# Patient Record
Sex: MALE | Race: WHITE | URBAN - METROPOLITAN AREA
[De-identification: names, ages, dates, MRNs, and addresses within clinical notes are randomized per-mention and may not be internally consistent; named-entity substitution may affect disease eponyms.]

---

## 2018-01-25 ENCOUNTER — OFFICE VISIT (OUTPATIENT)
Dept: FAMILY MEDICINE | Facility: CLINIC | Age: 28
End: 2018-01-25
Payer: COMMERCIAL

## 2018-01-25 VITALS
BODY MASS INDEX: 24.57 KG/M2 | TEMPERATURE: 98 F | SYSTOLIC BLOOD PRESSURE: 123 MMHG | WEIGHT: 165.9 LBS | DIASTOLIC BLOOD PRESSURE: 73 MMHG | HEART RATE: 79 BPM | RESPIRATION RATE: 16 BRPM | OXYGEN SATURATION: 98 % | HEIGHT: 69 IN

## 2018-01-25 DIAGNOSIS — F41.9 ANXIETY: ICD-10-CM

## 2018-01-25 DIAGNOSIS — Z00.00 ENCOUNTER FOR PREVENTIVE HEALTH EXAMINATION: Primary | ICD-10-CM

## 2018-01-25 DIAGNOSIS — G47.00 INSOMNIA, UNSPECIFIED TYPE: ICD-10-CM

## 2018-01-25 DIAGNOSIS — H52.13 MYOPIA OF BOTH EYES: ICD-10-CM

## 2018-01-25 DIAGNOSIS — F90.9 ATTENTION DEFICIT HYPERACTIVITY DISORDER (ADHD), UNSPECIFIED ADHD TYPE: ICD-10-CM

## 2018-01-25 DIAGNOSIS — Z11.3 ROUTINE SCREENING FOR STI (SEXUALLY TRANSMITTED INFECTION): ICD-10-CM

## 2018-01-25 DIAGNOSIS — F19.20 CHEMICAL DEPENDENCY (H): ICD-10-CM

## 2018-01-25 DIAGNOSIS — Z72.0 TOBACCO ABUSE: ICD-10-CM

## 2018-01-25 DIAGNOSIS — G43.109 MIGRAINE WITH AURA AND WITHOUT STATUS MIGRAINOSUS, NOT INTRACTABLE: ICD-10-CM

## 2018-01-25 DIAGNOSIS — Z11.59 NEED FOR HEPATITIS B SCREENING TEST: ICD-10-CM

## 2018-01-25 DIAGNOSIS — Z23 ENCOUNTER FOR ADMINISTRATION OF VACCINE: ICD-10-CM

## 2018-01-25 LAB
CHOLEST SERPL-MCNC: 179 MG/DL
GLUCOSE P FAST SERPL-MCNC: 90 MG/DL (ref 51–110)
HDLC SERPL-MCNC: 52 MG/DL
LDLC SERPL CALC-MCNC: 113 MG/DL
NONHDLC SERPL-MCNC: 127 MG/DL
TRIGL SERPL-MCNC: 71 MG/DL

## 2018-01-25 RX ORDER — TRAZODONE HYDROCHLORIDE 100 MG/1
100 TABLET ORAL DAILY
COMMUNITY
Start: 2018-01-19 | End: 2018-01-25 | Stop reason: ALTCHOICE

## 2018-01-25 RX ORDER — IBUPROFEN 600 MG/1
600 TABLET, FILM COATED ORAL PRN
COMMUNITY
End: 2018-02-28

## 2018-01-25 RX ORDER — BUSPIRONE HYDROCHLORIDE 10 MG/1
40 TABLET ORAL DAILY
COMMUNITY
Start: 2018-01-19 | End: 2018-02-28 | Stop reason: DRUGHIGH

## 2018-01-25 RX ORDER — TRAZODONE HYDROCHLORIDE 150 MG/1
150 TABLET ORAL AT BEDTIME
Qty: 30 TABLET | Refills: 1 | Status: SHIPPED | OUTPATIENT
Start: 2018-01-25 | End: 2018-02-28

## 2018-01-25 RX ORDER — NAPROXEN 500 MG/1
500 TABLET ORAL 2 TIMES DAILY PRN
Qty: 30 TABLET | Refills: 1 | Status: SHIPPED | OUTPATIENT
Start: 2018-01-25 | End: 2018-02-28

## 2018-01-25 RX ORDER — CITALOPRAM HYDROBROMIDE 20 MG/1
20 TABLET ORAL DAILY
COMMUNITY
Start: 2018-01-19 | End: 2018-02-28

## 2018-01-25 ASSESSMENT — ANXIETY QUESTIONNAIRES
3. WORRYING TOO MUCH ABOUT DIFFERENT THINGS: NOT AT ALL
1. FEELING NERVOUS, ANXIOUS, OR ON EDGE: NOT AT ALL
6. BECOMING EASILY ANNOYED OR IRRITABLE: MORE THAN HALF THE DAYS
IF YOU CHECKED OFF ANY PROBLEMS ON THIS QUESTIONNAIRE, HOW DIFFICULT HAVE THESE PROBLEMS MADE IT FOR YOU TO DO YOUR WORK, TAKE CARE OF THINGS AT HOME, OR GET ALONG WITH OTHER PEOPLE: SOMEWHAT DIFFICULT
2. NOT BEING ABLE TO STOP OR CONTROL WORRYING: NOT AT ALL
7. FEELING AFRAID AS IF SOMETHING AWFUL MIGHT HAPPEN: NOT AT ALL
GAD7 TOTAL SCORE: 6
5. BEING SO RESTLESS THAT IT IS HARD TO SIT STILL: MORE THAN HALF THE DAYS

## 2018-01-25 ASSESSMENT — PAIN SCALES - GENERAL: PAINLEVEL: NO PAIN (0)

## 2018-01-25 ASSESSMENT — PATIENT HEALTH QUESTIONNAIRE - PHQ9: 5. POOR APPETITE OR OVEREATING: MORE THAN HALF THE DAYS

## 2018-01-25 NOTE — MR AVS SNAPSHOT
After Visit Summary   1/25/2018    Darian Lim    MRN: [id number]           Patient Information     Date Of Birth          1990        Visit Information        Provider Department      1/25/2018 9:00 AM Lynne Davis NP [facility]        Today's Diagnoses     Encounter for preventive health examination    -  1    Migraine with aura and without status migrainosus, not intractable        Chemical dependency (H)        Insomnia, unspecified type        Tobacco abuse        Routine screening for STI (sexually transmitted infection)        Myopia of both eyes        Encounter for administration of vaccine        Need for hepatitis B screening test          Care Instructions    1) New medications ordered to pharmacy.  2) Referral to [facility], the psychology specialists to adjust medications.  3) Referral to ophthalmology  4) Lab work pending, will call for follow-up appt if abnormal results.          Follow-ups after your visit        Additional Services     OPHTHALMOLOGY ADULT REFERRAL       Your provider has referred you to: [facility]: Eye Clinic [facility] [phone]   [email or web address]    Please be aware that coverage of these services is subject to the terms and limitations of your health insurance plan.  Call member services at your health plan with any benefit or coverage questions.      Please bring the following with you to your appointment:    (1) Any X-Rays, CTs or MRIs which have been performed.  Contact the facility where they were done to arrange for  prior to your scheduled appointment.    (2) List of current medications  (3) This referral request   (4) Any documents/labs given to you for this referral                  Who to contact     Please call your clinic at [phone] to:    Ask questions about your health    Make or cancel appointments    Discuss your medicines    Learn about your test results    Speak to your doctor   If you have  "compliments or concerns about an experience at your clinic, or if you wish to file a complaint, please contact [facility] Patient Relations at [phone] or email us at [email or web address]         Additional Information About Your Visit        [email or web address] Information     "[employer] is an electronic gateway that provides easy, online access to your medical records. With "[employer] you can request a clinic appointment, read your test results, renew a prescription or communicate with your care team.     To sign up for "[employer] visit the website at [email or web address]   You will be asked to enter the access code listed below, as well as some personal information. Please follow the directions to create your username and password.     Your access code is: [email or web address]  Expires: 2018 10:00 AM     Your access code will  in 90 days. If you need help or a new code, please contact your [facility] or call [phone] for assistance.        Care EveryWhere ID     This is your Care EveryWhere ID. This could be used by other organizations to access your [facility] medical records  [id number]        Your Vitals Were     Pulse Temperature Respirations Height Pulse Oximetry BMI (Body Mass Index)    79 98  F (36.7  C) (Oral) 16 5' 9.3\" (176 cm) 98% 24.29 kg/m2       Blood Pressure from Last 3 Encounters:   18 123/73    Weight from Last 3 Encounters:   18 165 lb 14.4 oz (75.3 kg)              We Performed the Following     CHLAMYDIA TRACHOMATIS PCR     Glucose Fasting ([facility])     Hepatitis B Surface Antibody     Hepatitis C Screen Reflex to HCV RNA Quant and Genotype     HIV Antigen Antibody Combo     Lipid panel reflex to direct LDL Fasting     NEISSERIA GONORRHOEA PCR     OPHTHALMOLOGY ADULT REFERRAL     SMOKING CESSATION COUNSELING 3-10 MIN (Tobacco Nicotine) [94413]     TDAP VACCINE (BOOSTRIX)     VACCINE ADMINISTRATION, INITIAL "          Today's Medication Changes          These changes are accurate as of 1/25/18 10:00 AM.  If you have any questions, ask your nurse or doctor.               Start taking these medicines.        Dose/Directions    naproxen 500 MG tablet   Commonly known as:  NAPROSYN   Started by:  Lynne Davis NP        Dose:  500 mg   Take 1 tablet (500 mg) by mouth 2 times daily as needed for moderate pain   Quantity:  30 tablet   Refills:  1       nicotine 10 MG Inhaler   Commonly known as:  NICOTROL   Used for:  Tobacco abuse   Started by:  Lynne Davis NP        Dose:  6-16 Cartridge   Inhale 6-16 Cartridges into the lungs daily as needed for smoking cessation   Quantity:  180 each   Refills:  0         These medicines have changed or have updated prescriptions.        Dose/Directions    traZODone 150 MG tablet   Commonly known as:  DESYREL   This may have changed:    - medication strength  - how much to take  - when to take this   Used for:  Insomnia, unspecified type   Changed by:  Lynne Davis NP        Dose:  150 mg   Take 1 tablet (150 mg) by mouth At Bedtime   Quantity:  30 tablet   Refills:  1            Where to get your medicines      These medications were sent to [facility]/pharmacy #[id number] - [place] [address]     Phone:  [phone]     naproxen 500 MG tablet    nicotine 10 MG Inhaler    traZODone 150 MG tablet                Primary Care Provider Office Phone # Fax #    ZBIGNIEW Pastor [phone]       [address] [address]        Equal Access to Services     JONATHON AGUAYO AH: Hadii tania ku hadasho Soomaali, waaxda luqadaha, qaybta kaalmada adeegyada, fady diana. So [facility] [phone].    ATENCIÓN: Si habla español, tiene a kelley disposición servicios gratuitos de asistencia lingüística. Llame al [phone].    We comply with applicable  federal civil rights laws and [place] laws. We do not discriminate on the basis of race, color, national origin, age, disability, sex, sexual orientation, or gender identity.            Thank you!     Thank you for choosing [facility]  for your care. Our goal is always to provide you with excellent care. Hearing back from our patients is one way we can continue to improve our services. Please take a few minutes to complete the written survey that you may receive in the mail after your visit with us. Thank you!             Your Updated Medication List - Protect others around you: Learn how to safely use, store and throw away your medicines at www.disposemymeds.org.          This list is accurate as of 1/25/18 10:00 AM.  Always use your most recent med list.                   Brand Name Dispense Instructions for use Diagnosis    busPIRone 10 MG tablet    BUSPAR     Take 20 mg by mouth daily        citalopram 20 MG tablet    celeXA     Take 20 mg by mouth daily        ibuprofen 600 MG tablet    ADVIL/MOTRIN     Take 600 mg by mouth as needed for moderate pain        naproxen 500 MG tablet    NAPROSYN    30 tablet    Take 1 tablet (500 mg) by mouth 2 times daily as needed for moderate pain        nicotine 10 MG Inhaler    NICOTROL    180 each    Inhale 6-16 Cartridges into the lungs daily as needed for smoking cessation    Tobacco abuse       traZODone 150 MG tablet    DESYREL    30 tablet    Take 1 tablet (150 mg) by mouth At Bedtime    Insomnia, unspecified type

## 2018-01-25 NOTE — NURSING NOTE
"27 year old  Chief Complaint   Patient presents with     Physical     Last physical: October of 2017. No questions or concerns. No flu shot.      Imm/Inj     Would like tetanus shot. Don't remember when last tetanus shot was. Don't know if he ever had a tetnaus.        Blood pressure 123/73, pulse 79, temperature 98  F (36.7  C), temperature source Oral, resp. rate 16, height 5' 9.3\" (176 cm), weight 165 lb 14.4 oz (75.3 kg), SpO2 98 %. Body mass index is 24.29 kg/(m^2).  BP completed using cuff size: regular    There is no problem list on file for this patient.      Wt Readings from Last 2 Encounters:   01/25/18 165 lb 14.4 oz (75.3 kg)     BP Readings from Last 3 Encounters:   01/25/18 123/73       Current Outpatient Prescriptions   Medication     busPIRone (BUSPAR) 10 MG tablet     citalopram (CELEXA) 20 MG tablet     traZODone (DESYREL) 100 MG tablet     ibuprofen (ADVIL/MOTRIN) 600 MG tablet     No current facility-administered medications for this visit.        Social History   Substance Use Topics     Smoking status: Current Every Day Smoker     Packs/day: 1.00     Types: Cigarettes     Smokeless tobacco: Never Used     Alcohol use Not on file       Health Maintenance Due   Topic Date Due     TETANUS IMMUNIZATION (SYSTEM ASSIGNED)  07/21/2010     INFLUENZA VACCINE (SYSTEM ASSIGNED)  09/01/2017       No results found for: PAP    PHQ-2 ( 1999 Pfizer) 1/25/2018   Q1: Little interest or pleasure in doing things 0   Q2: Feeling down, depressed or hopeless 0   PHQ-2 Score 0       PHQ-9 SCORE 1/25/2018   Total Score 9       IBETH-7 SCORE 1/25/2018   Total Score 6       No flowsheet data found.    Briana Bosch MA  January 25, 2018 9:00 AM  "

## 2018-01-25 NOTE — PATIENT INSTRUCTIONS
1) New medications ordered to pharmacy.  2) Referral to [facility], the psychology specialists to adjust medications.  3) Referral to ophthalmology  4) Lab work pending, will call for follow-up appt if abnormal results.

## 2018-01-26 LAB
C TRACH DNA SPEC QL NAA+PROBE: POSITIVE
HBV SURFACE AB SERPL IA-ACNC: 8.76 M[IU]/ML
HCV AB SERPL QL IA: NONREACTIVE
HIV 1+2 AB+HIV1 P24 AG SERPL QL IA: NONREACTIVE
N GONORRHOEA DNA SPEC QL NAA+PROBE: NEGATIVE
SPECIMEN SOURCE: ABNORMAL
SPECIMEN SOURCE: NORMAL

## 2018-01-26 ASSESSMENT — ANXIETY QUESTIONNAIRES: GAD7 TOTAL SCORE: 6

## 2018-01-26 ASSESSMENT — PATIENT HEALTH QUESTIONNAIRE - PHQ9: SUM OF ALL RESPONSES TO PHQ QUESTIONS 1-9: 9

## 2018-01-30 ENCOUNTER — TELEPHONE (OUTPATIENT)
Dept: FAMILY MEDICINE | Facility: CLINIC | Age: 28
End: 2018-01-30

## 2018-01-30 DIAGNOSIS — A74.9 CHLAMYDIA: Primary | ICD-10-CM

## 2018-01-30 RX ORDER — AZITHROMYCIN 500 MG/1
1000 TABLET, FILM COATED ORAL ONCE
Qty: 2 TABLET | Refills: 0 | Status: SHIPPED | OUTPATIENT
Start: 2018-01-30 | End: 2018-01-30

## 2018-01-31 ENCOUNTER — TELEPHONE (OUTPATIENT)
Dept: FAMILY MEDICINE | Facility: CLINIC | Age: 28
End: 2018-01-31

## 2018-01-31 NOTE — TELEPHONE ENCOUNTER
1/26/18:  Patient was called with positive chlamydia report and medication was prescribed and sent to pharmacy.    ([facility] EHR was down so patient contacted through treatment facility and spoken to personally.)

## 2018-02-28 ENCOUNTER — OFFICE VISIT (OUTPATIENT)
Dept: FAMILY MEDICINE | Facility: CLINIC | Age: 28
End: 2018-02-28
Payer: COMMERCIAL

## 2018-02-28 VITALS
WEIGHT: 167.4 LBS | OXYGEN SATURATION: 96 % | RESPIRATION RATE: 18 BRPM | DIASTOLIC BLOOD PRESSURE: 75 MMHG | SYSTOLIC BLOOD PRESSURE: 127 MMHG | BODY MASS INDEX: 24.51 KG/M2 | HEART RATE: 85 BPM | TEMPERATURE: 98.2 F

## 2018-02-28 DIAGNOSIS — Z72.0 TOBACCO ABUSE: ICD-10-CM

## 2018-02-28 DIAGNOSIS — G89.29 CHRONIC LOW BACK PAIN, UNSPECIFIED BACK PAIN LATERALITY, WITH SCIATICA PRESENCE UNSPECIFIED: ICD-10-CM

## 2018-02-28 DIAGNOSIS — G43.109 MIGRAINE WITH AURA AND WITHOUT STATUS MIGRAINOSUS, NOT INTRACTABLE: ICD-10-CM

## 2018-02-28 DIAGNOSIS — M54.5 CHRONIC LOW BACK PAIN, UNSPECIFIED BACK PAIN LATERALITY, WITH SCIATICA PRESENCE UNSPECIFIED: ICD-10-CM

## 2018-02-28 DIAGNOSIS — G47.00 INSOMNIA, UNSPECIFIED TYPE: Primary | ICD-10-CM

## 2018-02-28 RX ORDER — QUETIAPINE FUMARATE 50 MG/1
50 TABLET, FILM COATED ORAL
Qty: 30 TABLET | Refills: 0 | Status: SHIPPED | OUTPATIENT
Start: 2018-02-28

## 2018-02-28 RX ORDER — NAPROXEN 500 MG/1
500 TABLET ORAL 2 TIMES DAILY PRN
Qty: 30 TABLET | Refills: 1 | Status: SHIPPED | OUTPATIENT
Start: 2018-02-28

## 2018-02-28 ASSESSMENT — PAIN SCALES - GENERAL: PAINLEVEL: SEVERE PAIN (6)
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
## 2019-11-15 NOTE — TELEPHONE ENCOUNTER
[facility].     Lynne Davis, DNP, APRN, CNP    
Patients medication need to go to [facility] on [address]. Gabriel from  [facility] can be reached at the incoming number with any questions.  
 used